# Patient Record
Sex: MALE | Race: BLACK OR AFRICAN AMERICAN
[De-identification: names, ages, dates, MRNs, and addresses within clinical notes are randomized per-mention and may not be internally consistent; named-entity substitution may affect disease eponyms.]

---
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
## 2020-05-28 ENCOUNTER — HOSPITAL ENCOUNTER (EMERGENCY)
Dept: HOSPITAL 76 - ED | Age: 20
Discharge: HOME | End: 2020-05-28
Payer: COMMERCIAL

## 2020-05-28 VITALS — DIASTOLIC BLOOD PRESSURE: 90 MMHG | SYSTOLIC BLOOD PRESSURE: 119 MMHG

## 2020-05-28 DIAGNOSIS — K21.0: Primary | ICD-10-CM

## 2020-05-28 LAB
ALBUMIN DIAFP-MCNC: 4.8 G/DL (ref 3.2–5.5)
ALBUMIN/GLOB SERPL: 1.5 {RATIO} (ref 1–2.2)
ALP SERPL-CCNC: 53 IU/L (ref 42–121)
ALT SERPL W P-5'-P-CCNC: 12 IU/L (ref 10–60)
ANION GAP SERPL CALCULATED.4IONS-SCNC: 7 MMOL/L (ref 6–13)
AST SERPL W P-5'-P-CCNC: 18 IU/L (ref 10–42)
BASOPHILS NFR BLD AUTO: 0 10^3/UL (ref 0–0.1)
BASOPHILS NFR BLD AUTO: 1.2 %
BILIRUB BLD-MCNC: 1.6 MG/DL (ref 0.2–1)
BUN SERPL-MCNC: 8 MG/DL (ref 6–20)
CALCIUM UR-MCNC: 9.6 MG/DL (ref 8.5–10.3)
CHLORIDE SERPL-SCNC: 102 MMOL/L (ref 101–111)
CO2 SERPL-SCNC: 29 MMOL/L (ref 21–32)
CREAT SERPLBLD-SCNC: 0.9 MG/DL (ref 0.6–1.2)
EOSINOPHIL # BLD AUTO: 0.1 10^3/UL (ref 0–0.7)
EOSINOPHIL NFR BLD AUTO: 2 %
ERYTHROCYTE [DISTWIDTH] IN BLOOD BY AUTOMATED COUNT: 12 % (ref 12–15)
GLOBULIN SER-MCNC: 3.2 G/DL (ref 2.1–4.2)
GLUCOSE SERPL-MCNC: 83 MG/DL (ref 70–100)
HGB UR QL STRIP: 15.6 G/DL (ref 14–18)
LIPASE SERPL-CCNC: 32 U/L (ref 22–51)
LYMPHOCYTES # SPEC AUTO: 1.8 10^3/UL (ref 1.5–3.5)
LYMPHOCYTES NFR BLD AUTO: 52.8 %
MCH RBC QN AUTO: 28.4 PG (ref 27–31)
MCHC RBC AUTO-ENTMCNC: 32.7 G/DL (ref 32–36)
MCV RBC AUTO: 86.9 FL (ref 80–94)
MONOCYTES # BLD AUTO: 0.2 10^3/UL (ref 0–1)
MONOCYTES NFR BLD AUTO: 4.4 %
NEUTROPHILS # BLD AUTO: 1.4 10^3/UL (ref 1.5–6.6)
NEUTROPHILS # SNV AUTO: 3.4 X10^3/UL (ref 4.8–10.8)
NEUTROPHILS NFR BLD AUTO: 39.6 %
PDW BLD AUTO: 8.9 FL (ref 7.4–11.4)
PLATELET # BLD: 192 10^3/UL (ref 130–450)
PROT SPEC-MCNC: 8 G/DL (ref 6.7–8.2)
RBC MAR: 5.49 10^6/UL (ref 4.7–6.1)
SODIUM SERPLBLD-SCNC: 138 MMOL/L (ref 135–145)

## 2020-05-28 PROCEDURE — 93005 ELECTROCARDIOGRAM TRACING: CPT

## 2020-05-28 PROCEDURE — 99284 EMERGENCY DEPT VISIT MOD MDM: CPT

## 2020-05-28 PROCEDURE — 36415 COLL VENOUS BLD VENIPUNCTURE: CPT

## 2020-05-28 PROCEDURE — 85025 COMPLETE CBC W/AUTO DIFF WBC: CPT

## 2020-05-28 PROCEDURE — 80053 COMPREHEN METABOLIC PANEL: CPT

## 2020-05-28 PROCEDURE — 83690 ASSAY OF LIPASE: CPT

## 2020-05-28 PROCEDURE — 99283 EMERGENCY DEPT VISIT LOW MDM: CPT

## 2020-05-28 NOTE — ED PHYSICIAN DOCUMENTATION
PD HPI ABD PAIN





- Stated complaint


Stated Complaint: Indigestion





- Chief complaint


Chief Complaint: Abd Pain





- History obtained from


History obtained from: Patient (20-year-old who for the last 5 days has had 

chest and neck burning, it is worse after eating, about 10 minutes after eating.

 He has been taking Nexium without relief and also tried some Mylanta without 

relief.  It does not get worse with exertion.  He denies shortness of breath or 

cough.)





Review of Systems


Constitutional: reports: Reviewed and negative


Throat: reports: Reviewed and negative


Cardiac: reports: Chest pain / pressure.  denies: Palpitations, Pedal edema, 

Calf pain





PD PAST MEDICAL HISTORY





- Past Medical History


Cardiovascular: None


Respiratory: None


Neuro: None


Endocrine/Autoimmune: None


GI: None


: None


HEENT: None


Psych: None


Musculoskeletal: Other


Derm: None





- Past Surgical History


Past Surgical History: No


General: EGD





- Present Medications


Home Medications: 


                                Ambulatory Orders











 Medication  Instructions  Recorded  Confirmed


 


Oxycodone HCl/Acetaminophen 1 - 2 each PO Q6H PRN #14 tablet 11/10/19 11/15/19





[Percocet 5-325 mg Tablet]   


 


Pantoprazole Sodium [Protonix] 40 mg PO BID #60 tablet. 05/28/20 


 


Sucralfate [Carafate] 1 gm PO ACHS #60 tablet 05/28/20 














- Allergies


Allergies/Adverse Reactions: 


                                    Allergies











Allergy/AdvReac Type Severity Reaction Status Date / Time


 


No Known Drug Allergies Allergy   Verified 05/28/20 14:08














- Social History


Does the pt smoke?: No


Smoking Status: Never smoker


Does the pt drink ETOH?: No


Does the pt have substance abuse?: No





- Immunizations


Immunizations are current?: Yes





- POLST


Patient has POLST: No





PD ED PE NORMAL





- Vitals


Vital signs reviewed: Yes





- General


General: Alert and oriented X 3, No acute distress





- HEENT


HEENT: Pharynx benign





- Neck


Neck: Supple, no meningeal sign, No bony TTP





- Cardiac


Cardiac: RRR, No murmur





- Respiratory


Respiratory: No respiratory distress, Clear bilaterally





- Abdomen


Abdomen: Non tender





Results





- Vitals


Vitals: 


                               Vital Signs - 24 hr











  05/28/20





  14:08


 


Temperature 36.7 C


 


Heart Rate 80


 


Respiratory 15





Rate 


 


Blood Pressure 125/77


 


O2 Saturation 99








                                     Oxygen











O2 Source                      Room air

















- EKG (time done)


  ** 1417


Rate: Rate (enter#) (91)


Rhythm: NSR


Axis: Normal


Intervals: Normal DE


QRS: Normal


Ischemia: ST elevation c/w repol.  No: ST elevation c/w ischemia, ST depression





- Labs


Labs: 


                                Laboratory Tests











  05/28/20 05/28/20





  15:12 15:12


 


WBC  3.4 L 


 


RBC  5.49 


 


Hgb  15.6 


 


Hct  47.7 


 


MCV  86.9 


 


MCH  28.4 


 


MCHC  32.7 


 


RDW  12.0 


 


Plt Count  192 


 


MPV  8.9 


 


Neut # (Auto)  1.4 L 


 


Lymph # (Auto)  1.8 


 


Mono # (Auto)  0.2 


 


Eos # (Auto)  0.1 


 


Baso # (Auto)  0.0 


 


Absolute Nucleated RBC  0.00 


 


Nucleated RBC %  0.0 


 


Sodium   138


 


Potassium   3.9


 


Chloride   102


 


Carbon Dioxide   29


 


Anion Gap   7.0


 


BUN   8


 


Creatinine   0.9


 


Estimated GFR (MDRD)   130


 


Glucose   83


 


Calcium   9.6


 


Total Bilirubin   1.6 H


 


AST   18


 


ALT   12


 


Alkaline Phosphatase   53


 


Total Protein   8.0


 


Albumin   4.8


 


Globulin   3.2


 


Albumin/Globulin Ratio   1.5


 


Lipase   32














PD MEDICAL DECISION MAKING





- ED course


ED course: 





20-year-old gentleman with history of what sounds like reflux, no evidence of 

heart disease.  Heart score 0.  Feeling much better after a GI cocktail.





Departure





- Departure


Disposition: 01 Home, Self Care


Clinical Impression: 


GERD (gastroesophageal reflux disease)


Qualifiers:


 Esophagitis presence: with esophagitis Qualified Code(s): K21.0 - Gastro-

esophageal reflux disease with esophagitis





Condition: Good


Record reviewed to determine appropriate education?: Yes


Instructions:  ED GERD


Prescriptions: 


Pantoprazole Sodium [Protonix] 40 mg PO BID #60 tablet.


Sucralfate [Carafate] 1 gm PO ACHS #60 tablet


Comments: 


Call your doctor to arrange a follow-up appointment, make the next available 

appointment.  In the interim, return anytime if worse or if new symptoms 

develop.

## 2020-12-15 ENCOUNTER — HOSPITAL ENCOUNTER (EMERGENCY)
Dept: HOSPITAL 76 - ED | Age: 20
Discharge: HOME | End: 2020-12-15
Payer: COMMERCIAL

## 2020-12-15 VITALS — SYSTOLIC BLOOD PRESSURE: 138 MMHG | DIASTOLIC BLOOD PRESSURE: 90 MMHG

## 2020-12-15 DIAGNOSIS — Z20.828: ICD-10-CM

## 2020-12-15 DIAGNOSIS — J21.9: ICD-10-CM

## 2020-12-15 DIAGNOSIS — R07.9: Primary | ICD-10-CM

## 2020-12-15 LAB
ANION GAP SERPL CALCULATED.4IONS-SCNC: 9 MMOL/L (ref 6–13)
BASOPHILS NFR BLD AUTO: 0.1 10^3/UL (ref 0–0.1)
BASOPHILS NFR BLD AUTO: 1 %
BUN SERPL-MCNC: 12 MG/DL (ref 6–20)
CALCIUM UR-MCNC: 9.6 MG/DL (ref 8.5–10.3)
CHLORIDE SERPL-SCNC: 103 MMOL/L (ref 101–111)
CO2 SERPL-SCNC: 28 MMOL/L (ref 21–32)
CREAT SERPLBLD-SCNC: 0.9 MG/DL (ref 0.6–1.2)
EOSINOPHIL # BLD AUTO: 0.2 10^3/UL (ref 0–0.7)
EOSINOPHIL NFR BLD AUTO: 3.3 %
ERYTHROCYTE [DISTWIDTH] IN BLOOD BY AUTOMATED COUNT: 11.8 % (ref 12–15)
GLUCOSE SERPL-MCNC: 118 MG/DL (ref 70–100)
HGB UR QL STRIP: 15.1 G/DL (ref 14–18)
LYMPHOCYTES # SPEC AUTO: 3 10^3/UL (ref 1.5–3.5)
LYMPHOCYTES NFR BLD AUTO: 57.6 %
MCH RBC QN AUTO: 29.7 PG (ref 27–31)
MCHC RBC AUTO-ENTMCNC: 33.9 G/DL (ref 32–36)
MCV RBC AUTO: 87.6 FL (ref 80–94)
MONOCYTES # BLD AUTO: 0.2 10^3/UL (ref 0–1)
MONOCYTES NFR BLD AUTO: 4.3 %
NEUTROPHILS # BLD AUTO: 1.7 10^3/UL (ref 1.5–6.6)
NEUTROPHILS # SNV AUTO: 5.1 X10^3/UL (ref 4.8–10.8)
NEUTROPHILS NFR BLD AUTO: 33.4 %
PDW BLD AUTO: 9.3 FL (ref 7.4–11.4)
PLATELET # BLD: 193 10^3/UL (ref 130–450)
RBC MAR: 5.09 10^6/UL (ref 4.7–6.1)
SODIUM SERPLBLD-SCNC: 140 MMOL/L (ref 135–145)

## 2020-12-15 PROCEDURE — 85025 COMPLETE CBC W/AUTO DIFF WBC: CPT

## 2020-12-15 PROCEDURE — 80048 BASIC METABOLIC PNL TOTAL CA: CPT

## 2020-12-15 PROCEDURE — 93005 ELECTROCARDIOGRAM TRACING: CPT

## 2020-12-15 PROCEDURE — 36415 COLL VENOUS BLD VENIPUNCTURE: CPT

## 2020-12-15 PROCEDURE — 99284 EMERGENCY DEPT VISIT MOD MDM: CPT

## 2020-12-15 PROCEDURE — 84484 ASSAY OF TROPONIN QUANT: CPT

## 2020-12-15 NOTE — ED PHYSICIAN DOCUMENTATION
PD HPI CHEST PAIN





- Stated complaint


Stated Complaint: CHEST PX, NEAUSA





- Chief complaint


Chief Complaint: Cardiac





- History obtained from


History obtained from: Patient





- History of Present Illness


Timing - onset: How many hours ago (1)


Timing - onset during: Sleep


Timing - details: Abrupt onset


Pain level max: 9


Pain level now: 7


Quality: Pain


Location: Left chest


Radiation: Other (no radiation)


Associated symptoms: Nausea, Other (numbness "my whole body")


Similar symptoms before: Has not had sx before


Recently seen: Not recently seen





- Additional information


Additional information: 





awoken from sleep with left-sided chest pain and "whole body" (per patient) 

numbness. prior to this evaluation, numbness resolved and chest pain has 

singificantly improved





Review of Systems


Constitutional: denies: Fever


Cardiac: reports: Chest pain / pressure.  denies: Palpitations, Pedal edema, 

Calf pain


Respiratory: reports: Reviewed and negative


GI: reports: Reviewed and negative


Musculoskeletal: denies: Extremity swelling





PD PAST MEDICAL HISTORY





- Past Medical History


Cardiovascular: None


Respiratory: None


Neuro: None


Endocrine/Autoimmune: None


GI: GERD


: None


HEENT: None


Psych: None


Musculoskeletal: Other


Derm: None





- Past Surgical History


Past Surgical History: No


General: EGD





- Present Medications


Home Medications: 


                                Ambulatory Orders











 Medication  Instructions  Recorded  Confirmed


 


No Known Home Medications  12/15/20 12/15/20














- Allergies


Allergies/Adverse Reactions: 


                                    Allergies











Allergy/AdvReac Type Severity Reaction Status Date / Time


 


No Known Drug Allergies Allergy   Verified 12/15/20 05:45














- Social History


Does the pt smoke?: No


Smoking Status: Never smoker


Does the pt drink ETOH?: No


Does the pt have substance abuse?: No





- Immunizations


Immunizations are current?: Yes





- POLST


Patient has POLST: No





PD ED PE NORMAL





- Vitals


Vital signs reviewed: Yes





- General


General: Alert and oriented X 3, No acute distress, Well developed/nourished





- Cardiac


Cardiac: RRR, No murmur, No gallop, No rub





- Respiratory


Respiratory: No respiratory distress, Clear bilaterally





- Abdomen


Abdomen: Soft, Non tender





- Extremities


Extremities: No edema





Results





- Vitals


Vitals: 


                                     Oxygen











O2 Source                      Room air

















- EKG (time done)


  ** No standard instances


Rate: Rate (enter#) (76)


Rhythm: NSR


Axis: Normal


Intervals: Normal NM


QRS: Normal


Ischemia: ST elevation c/w repol


Compare to prior EKG: Unchanged from prior EKG (5/28/20 shows similar ST 

elevation/early repol pattern)





- Labs


Labs: 


                                Laboratory Tests











  12/15/20 12/15/20 12/15/20





  05:59 05:59 05:59


 


WBC  5.1  


 


RBC  5.09  


 


Hgb  15.1  


 


Hct  44.6  


 


MCV  87.6  


 


MCH  29.7  


 


MCHC  33.9  


 


RDW  11.8 L  


 


Plt Count  193  


 


MPV  9.3  


 


Neut # (Auto)  1.7  


 


Lymph # (Auto)  3.0  


 


Mono # (Auto)  0.2  


 


Eos # (Auto)  0.2  


 


Baso # (Auto)  0.1  


 


Absolute Nucleated RBC  0.00  


 


Nucleated RBC %  0.0  


 


Sodium   140 


 


Potassium   3.8 


 


Chloride   103 


 


Carbon Dioxide   28 


 


Anion Gap   9.0 


 


BUN   12 


 


Creatinine   0.9 


 


Estimated GFR (MDRD)   130 


 


Glucose   118 H 


 


Calcium   9.6 


 


Troponin I High Sens    3.6














- Rads (name of study)


  ** chest xray


Radiology: Prelim report reviewed, See rad report





PD MEDICAL DECISION MAKING





- ED course


Complexity details: reviewed results, re-evaluated patient, considered 

differential, d/w patient





Departure





- Departure


Disposition: 01 Home, Self Care


Clinical Impression: 


 Chest pain, Bronchiolitis





Condition: Good


Instructions:  ED Upper Resp Infec No  Abx Tx, ED Chest Pain Atypical Unkn Cause


Comments: 


I have low suspicion that the COVID test will be positive, as your symptoms are 

limited to chest pain (no fever, cough, shortness of breath, headache). Because 

the chest xray is consistent with a viral upper respiratory infection, it is 

best to perform the COVID test despite lack of these other symptoms. I advise 

that you stay home until the test result returns negative AND your symptoms have

completely resolved. If your test returns positive, you will need to isolate in 

quarantine at home and follow the [place] Department of Health guidelines for

a positive COVID test. 


Forms:  Activity restrictions


Discharge Date/Time: 12/15/20 08:48

## 2020-12-15 NOTE — XRAY REPORT
PROCEDURE:  Chest 2 View X-Ray

 

INDICATIONS:  chest pain

 

TECHNIQUE:  2 view(s) of the chest.  

 

COMPARISON:  None.

 

FINDINGS:  

 

Surgical changes and devices:  None.  

 

Lungs and pleura:  No pleural effusions or pneumothorax. Mildly increased from the vascular markings 
in bilateral hilar region are seen with bronchial wall thickening. No focal infiltrate.

 

Mediastinum:  Mediastinal contours are normal.  Heart size is normal.  

 

Bones and chest wall:  No suspicious bony abnormalities.  Soft tissues appear unremarkable.  

 

IMPRESSION:  Finding is concerning for reactive airway disease such as bronchiolitis or asthma. No fo
otf infiltrate.

 

No significant discrepancies from preliminary reading.

 

Reviewed by: Bill Agudelo MD on 12/15/2020 8:30 AM PST

Approved by: Bill Agudelo MD on 12/15/2020 8:30 AM PST

 

 

Station ID:  [id number]

## 2021-02-20 ENCOUNTER — HOSPITAL ENCOUNTER (EMERGENCY)
Dept: HOSPITAL 76 - ED | Age: 21
Discharge: HOME | End: 2021-02-20
Payer: COMMERCIAL

## 2021-02-20 VITALS — SYSTOLIC BLOOD PRESSURE: 124 MMHG | DIASTOLIC BLOOD PRESSURE: 78 MMHG

## 2021-02-20 DIAGNOSIS — J02.0: Primary | ICD-10-CM

## 2021-02-20 DIAGNOSIS — Z20.822: ICD-10-CM

## 2021-02-20 LAB — S PYO AG THROAT QL IA.RAPID: POSITIVE

## 2021-02-20 PROCEDURE — 87635 SARS-COV-2 COVID-19 AMP PRB: CPT

## 2021-02-20 PROCEDURE — 87430 STREP A AG IA: CPT

## 2021-02-20 PROCEDURE — 99283 EMERGENCY DEPT VISIT LOW MDM: CPT

## 2021-02-20 PROCEDURE — 99284 EMERGENCY DEPT VISIT MOD MDM: CPT

## 2021-02-20 NOTE — ED PHYSICIAN DOCUMENTATION
PD HPI URI





- Stated complaint


Stated Complaint: FEVER,THROAT PX,BODY ACHES





- History obtained from


History obtained from: Patient





- History of Present Illness


Timing - onset: Today (onset this morning of aching, chills, sore throat that 

has persisted through the day and worsening this evening. Having some 

intermittent mid to lower abd cramping pains at times as well. An episode of 

diarrhea. Nausea without vomiting.)


Timing duration: Days (1)


Timing details: Gradual onset, Still present


Associated symptoms: Fever, Chills, Sore throat, NVD.  No: Nasal congestion, Dry

cough, Dyspnea


Contributing factors: No: Sick contact, Immunocompromised, COPD / asthma


Improves by: Medication (Ibuprofen)


Similar symptoms before: Has not had sx before





Review of Systems


Constitutional: reports: Fever, Chills


Nose: denies: Rhinorrhea / runny nose, Congestion


Throat: reports: Sore throat


Respiratory: denies: Cough


GI: reports: Abdominal Pain (mid to lower abd intermittently today), Nausea.  

denies: Vomiting, Constipation, Diarrhea (but did have one loose stool.)


: denies: Dysuria, Discharge


Skin: denies: Rash, Lesions


Neurologic: denies: Near syncope, Altered mental status, Headache





PD PAST MEDICAL HISTORY





- Past Medical History


Cardiovascular: None


Respiratory: None


Neuro: None


Endocrine/Autoimmune: None


GI: GERD


: None


HEENT: None


Psych: None


Musculoskeletal: Other


Derm: None





- Past Surgical History


Past Surgical History: No


General: EGD





- Present Medications


Home Medications: 


                                Ambulatory Orders











 Medication  Instructions  Recorded  Confirmed


 


Amoxicillin 500 mg PO TID #21 cap 02/20/21 


 


Ondansetron Odt [Zofran] 4 mg TL Q6H PRN #10 tab 02/20/21 


 


dexAMETHasone [Decadron] 4 mg PO DAILY #5 tab 02/20/21 














- Allergies


Allergies/Adverse Reactions: 


                                    Allergies











Allergy/AdvReac Type Severity Reaction Status Date / Time


 


No Known Drug Allergies Allergy   Verified 02/20/21 00:38














- Social History


Does the pt smoke?: No


Smoking Status: Never smoker


Does the pt drink ETOH?: No


Does the pt have substance abuse?: No





- Immunizations


Immunizations are current?: Yes





- POLST


Patient has POLST: No





PD ED PE NORMAL





- Vitals


Vital signs reviewed: Yes





- General


General: Alert and oriented X 3, Well developed/nourished





- HEENT


HEENT: Ears normal, Moist mucous membranes.  No: Pharynx benign (some redness in

tonsillar area with small tonsils. No focal peritonsillar swelling seen. He does

have minimal garbling of voice sounds though. )





- Neck


Neck: Supple, no meningeal sign, Other (mild anterior adenopathy. No posterior 

nodes. )





- Cardiac


Cardiac: RRR, No murmur





- Respiratory


Respiratory: Clear bilaterally





- Abdomen


Abdomen: Normal bowel sounds, Soft, Non distended, No organomegaly, Other 

(minimally tender without guarding mid abdomen. Not tender Mc[place]neys point. Mild

tender right inguinal without mass/hernia. )





- Male 


Male : Deferred





- Derm


Derm: Normal color, Warm and dry, No rash





Results





- Vitals


Vitals: 


                               Vital Signs - 24 hr











  02/20/21 02/20/21 02/20/21





  00:34 00:51 01:07


 


Temperature 36.9 C  


 


Heart Rate 96  87


 


Respiratory 16 16 16





Rate   


 


Blood Pressure 122/76  124/78


 


O2 Saturation 99  98














  02/20/21 02/20/21





  01:16 01:19


 


Temperature  


 


Heart Rate  


 


Respiratory 15 15





Rate  


 


Blood Pressure  


 


O2 Saturation  








                                     Oxygen











O2 Source                      Room air

















- Labs


Labs: 


                                Laboratory Tests











  02/20/21





  00:35


 


Group A Strep Rapid  POSITIVE H














PD MEDICAL DECISION MAKING





- ED course


Complexity details: reviewed results (rapid strep test positive. COVID pending. 

), considered differential (no peritonsillar swelling. Normal speaking and 

respirations. ), d/w patient





Departure





- Departure


Disposition: 01 Home, Self Care


Clinical Impression: 


 Acute streptococcal pharyngitis





Condition: Stable


Record reviewed to determine appropriate education?: Yes


Instructions:  ED Strep Pharyngitis Conf


Prescriptions: 


Amoxicillin 500 mg PO TID #21 cap


dexAMETHasone [Decadron] 4 mg PO DAILY #5 tab


Ondansetron Odt [Zofran] 4 mg TL Q6H PRN #10 tab


 PRN Reason: Nausea / Vomiting


Comments: 


Your strep test is positive for group A strep.  This is a bacteria and we will 

treated with antibiotics.  We did do a Covid test as well as it can be dual i

nfections.  This will result likely tomorrow or so.  We will call you if it is 

positive.





Meanwhile take amoxicillin 3 times a day for a week for the strep throat.  

Decadron steroid for inflammation will help as well.  The abdominal pain she 

been having are likely related to some lymph nodes in the intestine associated 

with strep throat commonly.  This should resolve over the next couple of days a

long with the throat.





Stay well-hydrated.  Tylenol or ibuprofen as needed for pains or both.  

Ondansetron if needed for nausea.





I would anticipate improvement over the next 2 to 3 days.  Return if worsening 

rather than better.  Recheck if not improved over the next 3 to 5 days.


Forms:  Activity restrictions


Discharge Date/Time: 02/20/21 01:29

## 2021-03-06 ENCOUNTER — HOSPITAL ENCOUNTER (EMERGENCY)
Dept: HOSPITAL 76 - ED | Age: 21
Discharge: HOME | End: 2021-03-06
Payer: COMMERCIAL

## 2021-03-06 VITALS — SYSTOLIC BLOOD PRESSURE: 123 MMHG | DIASTOLIC BLOOD PRESSURE: 73 MMHG

## 2021-03-06 DIAGNOSIS — J02.8: Primary | ICD-10-CM

## 2021-03-06 PROCEDURE — 99283 EMERGENCY DEPT VISIT LOW MDM: CPT

## 2021-03-06 PROCEDURE — 87070 CULTURE OTHR SPECIMN AEROBIC: CPT

## 2021-03-06 PROCEDURE — 87430 STREP A AG IA: CPT

## 2021-03-06 NOTE — ED PHYSICIAN DOCUMENTATION
PD HPI HEENT





- Stated complaint


Stated Complaint: THROAT PX





- Chief complaint


Chief Complaint: Heent





- History obtained from


History obtained from: Patient (He was diagnosed with strep throat a couple 

weeks ago and completed antibiotics.  He did get better.  After the cessation of

antibiotics he has a sore throat again.  No fevers, runny nose, or cough.)





Review of Systems


Ten Systems: 10 systems reviewed and negative


Constitutional: denies: Fever, Chills


Ears: reports: Reviewed and negative


Nose: reports: Reviewed and negative





PD PAST MEDICAL HISTORY





- Past Medical History


Cardiovascular: None


Respiratory: None


Neuro: None


Endocrine/Autoimmune: None


GI: GERD


: None


HEENT: None


Psych: None


Musculoskeletal: Other


Derm: None


Other Past Medical History: Right hand fracture





- Past Surgical History


Past Surgical History: No


General: EGD





- Present Medications


Home Medications: 


                                Ambulatory Orders











 Medication  Instructions  Recorded  Confirmed


 


Omeprazole 40 mg PO 03/06/21 


 


predniSONE [Deltasone] 60 mg PO DAILY 5 Days #15 tablet 03/06/21 














- Allergies


Allergies/Adverse Reactions: 


                                    Allergies











Allergy/AdvReac Type Severity Reaction Status Date / Time


 


No Known Drug Allergies Allergy   Verified 03/06/21 18:20














- Social History


Does the pt smoke?: No


Smoking Status: Never smoker


Does the pt drink ETOH?: No


Does the pt have substance abuse?: No





- Immunizations


Immunizations are current?: Yes





- POLST


Patient has POLST: No





PD ED PE NORMAL





- Vitals


Vital signs reviewed: Yes





- General


General: Alert and oriented X 3, No acute distress





- HEENT


HEENT: Other (Red tonsillar pillars without exudates or swelling, no cervical 

adenopathy.  Supple neck.)





- Neuro


Neuro: Alert and oriented X 3, Normal speech





Results





- Vitals


Vitals: 





                               Vital Signs - 24 hr











  03/06/21





  18:08


 


Temperature 36.8 C


 


Heart Rate 93


 


Respiratory 16





Rate 


 


Blood Pressure 123/73


 


O2 Saturation 100








                                     Oxygen











O2 Source                      Room air

















- Labs


Labs: 





                                Laboratory Tests











  03/06/21





  18:27


 


Group A Strep Rapid  Negative














Departure





- Departure


Disposition: 01 Home, Self Care


Clinical Impression: 


 Acute viral pharyngitis





Condition: Good


Record reviewed to determine appropriate education?: Yes


Instructions:  ED Pharyngitis Viral Report Pending


Prescriptions: 


predniSONE [Deltasone] 60 mg PO DAILY 5 Days #15 tablet


Comments: 


Your strep test is negative, we are performing a throat culture and will call 

you in a couple of days if it is positive otherwise the prednisone should help 

with the inflammation.  Return if worsening.